# Patient Record
Sex: FEMALE | Race: BLACK OR AFRICAN AMERICAN | URBAN - METROPOLITAN AREA
[De-identification: names, ages, dates, MRNs, and addresses within clinical notes are randomized per-mention and may not be internally consistent; named-entity substitution may affect disease eponyms.]

---

## 2024-10-28 ENCOUNTER — DOCUMENTATION (OUTPATIENT)
Dept: TRANSPLANT | Facility: HOSPITAL | Age: 34
End: 2024-10-28

## 2024-10-28 ENCOUNTER — TELEPHONE (OUTPATIENT)
Dept: TRANSPLANT | Facility: HOSPITAL | Age: 34
End: 2024-10-28

## 2024-10-28 VITALS — HEIGHT: 62 IN | BODY MASS INDEX: 22.07 KG/M2 | WEIGHT: 119.93 LBS

## 2024-10-29 ENCOUNTER — TELEPHONE (OUTPATIENT)
Dept: TRANSPLANT | Facility: HOSPITAL | Age: 34
End: 2024-10-29
Payer: COMMERCIAL

## 2024-11-07 ENCOUNTER — DOCUMENTATION (OUTPATIENT)
Dept: TRANSPLANT | Facility: HOSPITAL | Age: 34
End: 2024-11-07
Payer: COMMERCIAL

## 2024-11-07 NOTE — PROGRESS NOTES
TRANSPLANT REFERRAL REVIEW NOTE    Date: 11/7/2024  Time: 2:02 PM    Geeta Aguirre  was referred by: No referring provider defined for this encounter.     Nephrologist: Filemon  Dialysis unit: [facility]    This kidney transplant referral was reviewed by:    Surgeon: DR. KARLOS MOBLEY  Nephrologist: N/A    The recommendation is to refer patient to FAST TRACK kidney transplant evaluation clinic    Reason referral closure: NA    Task was sent to  to scheduled or close referral as above : NO

## 2024-11-18 ENCOUNTER — TELEPHONE (OUTPATIENT)
Dept: TRANSPLANT | Facility: HOSPITAL | Age: 34
End: 2024-11-18
Payer: COMMERCIAL

## 2024-11-18 DIAGNOSIS — N18.6 ESRD (END STAGE RENAL DISEASE) (MULTI): Primary | ICD-10-CM

## 2024-11-27 ENCOUNTER — TELEPHONE (OUTPATIENT)
Dept: TRANSPLANT | Facility: HOSPITAL | Age: 34
End: 2024-11-27

## 2024-11-27 ENCOUNTER — DOCUMENTATION (OUTPATIENT)
Dept: TRANSPLANT | Facility: HOSPITAL | Age: 34
End: 2024-11-27
Payer: COMMERCIAL

## 2024-11-27 NOTE — TELEPHONE ENCOUNTER
Called to provide info for kidney education class on 12.4.24, but there was no answer. Left voicemail with info

## 2024-11-27 NOTE — Clinical Note
Per EV [facility] Dual Plan &  Comm Plan Medicaid active.  Opened case with [employer]  & faxed clinical to  pending ref# [id number].

## 2024-11-27 NOTE — PROGRESS NOTES
Per EV [facility] Dual Plan &  Comm Plan Medicaid active.  Opened case with [employer]  & faxed clinical to  pending ref# [id number].

## 2024-12-04 ENCOUNTER — OFFICE VISIT (OUTPATIENT)
Dept: TRANSPLANT | Facility: HOSPITAL | Age: 34
End: 2024-12-04
Payer: MEDICARE

## 2024-12-04 ENCOUNTER — DOCUMENTATION (OUTPATIENT)
Dept: TRANSPLANT | Facility: HOSPITAL | Age: 34
End: 2024-12-04
Payer: COMMERCIAL

## 2024-12-04 ENCOUNTER — APPOINTMENT (OUTPATIENT)
Dept: TRANSPLANT | Facility: HOSPITAL | Age: 34
End: 2024-12-04
Payer: MEDICARE

## 2024-12-04 ENCOUNTER — LAB (OUTPATIENT)
Dept: LAB | Facility: LAB | Age: 34
End: 2024-12-04
Payer: MEDICARE

## 2024-12-04 ENCOUNTER — DOCUMENTATION (OUTPATIENT)
Dept: TRANSPLANT | Facility: HOSPITAL | Age: 34
End: 2024-12-04

## 2024-12-04 VITALS
WEIGHT: 121.2 LBS | DIASTOLIC BLOOD PRESSURE: 89 MMHG | HEART RATE: 75 BPM | SYSTOLIC BLOOD PRESSURE: 159 MMHG | OXYGEN SATURATION: 86 % | TEMPERATURE: 97.8 F | BODY MASS INDEX: 22.16 KG/M2

## 2024-12-04 DIAGNOSIS — K42.9 UMBILICAL HERNIA WITHOUT OBSTRUCTION AND WITHOUT GANGRENE: ICD-10-CM

## 2024-12-04 DIAGNOSIS — I10 ESSENTIAL HYPERTENSION: ICD-10-CM

## 2024-12-04 DIAGNOSIS — N18.6 END STAGE RENAL DISEASE (MULTI): ICD-10-CM

## 2024-12-04 DIAGNOSIS — N18.6 ESRD (END STAGE RENAL DISEASE) (MULTI): ICD-10-CM

## 2024-12-04 DIAGNOSIS — Z01.818 PRE-TRANSPLANT EVALUATION FOR KIDNEY TRANSPLANT: Primary | ICD-10-CM

## 2024-12-04 DIAGNOSIS — D63.1 ANEMIA IN ESRD (END-STAGE RENAL DISEASE) (MULTI): ICD-10-CM

## 2024-12-04 DIAGNOSIS — Z99.2 ESRD ON DIALYSIS (MULTI): Primary | ICD-10-CM

## 2024-12-04 DIAGNOSIS — Z13.6 ENCOUNTER FOR SCREENING FOR CARDIOVASCULAR DISORDERS: ICD-10-CM

## 2024-12-04 DIAGNOSIS — N18.6 ESRD ON DIALYSIS (MULTI): Primary | ICD-10-CM

## 2024-12-04 DIAGNOSIS — Z79.899 OTHER LONG TERM (CURRENT) DRUG THERAPY: ICD-10-CM

## 2024-12-04 DIAGNOSIS — Z51.81 ENCOUNTER FOR THERAPEUTIC DRUG LEVEL MONITORING: ICD-10-CM

## 2024-12-04 DIAGNOSIS — Z01.818 PRE-TRANSPLANT EVALUATION FOR KIDNEY TRANSPLANT: ICD-10-CM

## 2024-12-04 DIAGNOSIS — N18.6 ANEMIA IN ESRD (END-STAGE RENAL DISEASE) (MULTI): ICD-10-CM

## 2024-12-04 LAB
ABO GROUP (TYPE) IN BLOOD: NORMAL
ALBUMIN SERPL BCP-MCNC: 4.7 G/DL (ref 3.4–5)
ALP SERPL-CCNC: 116 U/L (ref 33–110)
ALT SERPL W P-5'-P-CCNC: 16 U/L (ref 7–45)
AMYLASE SERPL-CCNC: 63 U/L (ref 29–103)
AST SERPL W P-5'-P-CCNC: 18 U/L (ref 9–39)
BILIRUB DIRECT SERPL-MCNC: 0.1 MG/DL (ref 0–0.3)
BILIRUB SERPL-MCNC: 0.6 MG/DL (ref 0–1.2)
BUN SERPL-MCNC: 39 MG/DL (ref 6–23)
C PEPTIDE SERPL-MCNC: 9.5 NG/ML (ref 0.7–3.9)
CHOLEST SERPL-MCNC: 186 MG/DL (ref 0–199)
CHOLESTEROL/HDL RATIO: 2.6
CMV IGG AVIDITY SERPL IA-RTO: REACTIVE %
CREAT SERPL-MCNC: 7.88 MG/DL (ref 0.5–1.05)
EBV EA IGG SER QL: NEGATIVE
EBV NA AB SER QL: POSITIVE
EBV VCA IGG SER IA-ACNC: POSITIVE
EBV VCA IGM SER IA-ACNC: NEGATIVE
EGFRCR SERPLBLD CKD-EPI 2021: 6 ML/MIN/1.73M*2
ERYTHROCYTE [DISTWIDTH] IN BLOOD BY AUTOMATED COUNT: 14.8 % (ref 11.5–14.5)
EST. AVERAGE GLUCOSE BLD GHB EST-MCNC: 100 MG/DL
HBA1C MFR BLD: 5.1 %
HBV CORE AB SER QL: NONREACTIVE
HBV SURFACE AB SER-ACNC: 5.3 MIU/ML
HBV SURFACE AG SERPL QL IA: NONREACTIVE
HCT VFR BLD AUTO: 37.5 % (ref 36–46)
HCV AB SER QL: NONREACTIVE
HCYS SERPL-SCNC: 33.77 UMOL/L (ref 5–13.9)
HDLC SERPL-MCNC: 71.8 MG/DL
HGB BLD-MCNC: 11.7 G/DL (ref 12–16)
HIV 1+2 AB+HIV1 P24 AG SERPL QL IA: NONREACTIVE
INR PPP: 1.2 (ref 0.9–1.1)
MCH RBC QN AUTO: 29.3 PG (ref 26–34)
MCHC RBC AUTO-ENTMCNC: 31.2 G/DL (ref 32–36)
MCV RBC AUTO: 94 FL (ref 80–100)
NON-HDL CHOLESTEROL: 114 MG/DL (ref 0–149)
NRBC BLD-RTO: 0.8 /100 WBCS (ref 0–0)
PHOSPHATE SERPL-MCNC: 4.8 MG/DL (ref 2.5–4.9)
PLATELET # BLD AUTO: 262 X10*3/UL (ref 150–450)
PROT SERPL-MCNC: 7.3 G/DL (ref 6.4–8.2)
PROTHROMBIN TIME: 13 SECONDS (ref 9.8–12.8)
RBC # BLD AUTO: 3.99 X10*6/UL (ref 4–5.2)
RH FACTOR (ANTIGEN D): NORMAL
TREPONEMA PALLIDUM IGG+IGM AB [PRESENCE] IN SERUM OR PLASMA BY IMMUNOASSAY: NONREACTIVE
VARICELLA ZOSTER IGG INDEX: 4.1 IA
VZV IGG SER QL IA: POSITIVE
WBC # BLD AUTO: 5.9 X10*3/UL (ref 4.4–11.3)

## 2024-12-04 PROCEDURE — 86829 HLA CLASS I/II ANTIBODY QUAL: CPT | Mod: OUT | Performed by: STUDENT IN AN ORGANIZED HEALTH CARE EDUCATION/TRAINING PROGRAM

## 2024-12-04 PROCEDURE — 99215 OFFICE O/P EST HI 40 MIN: CPT | Performed by: INTERNAL MEDICINE

## 2024-12-04 PROCEDURE — 86825 HLA X-MATH NON-CYTOTOXIC: CPT | Mod: OUT | Performed by: STUDENT IN AN ORGANIZED HEALTH CARE EDUCATION/TRAINING PROGRAM

## 2024-12-04 PROCEDURE — 99213 OFFICE O/P EST LOW 20 MIN: CPT | Performed by: STUDENT IN AN ORGANIZED HEALTH CARE EDUCATION/TRAINING PROGRAM

## 2024-12-04 PROCEDURE — 81382 HLA II TYPING 1 LOC HR: CPT | Mod: OUT,59 | Performed by: STUDENT IN AN ORGANIZED HEALTH CARE EDUCATION/TRAINING PROGRAM

## 2024-12-04 RX ORDER — HYDRALAZINE HYDROCHLORIDE 50 MG/1
50 TABLET, FILM COATED ORAL 3 TIMES DAILY
COMMUNITY

## 2024-12-04 RX ORDER — CALCIUM CARBONATE 500 MG/1
TABLET, CHEWABLE ORAL
COMMUNITY

## 2024-12-04 RX ORDER — SPIRONOLACTONE 50 MG/1
50 TABLET, FILM COATED ORAL DAILY
COMMUNITY
End: 2024-12-04 | Stop reason: ALTCHOICE

## 2024-12-04 RX ORDER — CALCITRIOL 0.5 UG/1
0.5 CAPSULE ORAL
COMMUNITY

## 2024-12-04 RX ORDER — LISINOPRIL 20 MG/1
1 TABLET ORAL
COMMUNITY
Start: 2024-07-22 | End: 2024-12-04 | Stop reason: ALTCHOICE

## 2024-12-04 RX ORDER — SEVELAMER CARBONATE FOR ORAL SUSPENSION 2400 MG/1
POWDER, FOR SUSPENSION ORAL
COMMUNITY
Start: 2024-03-13

## 2024-12-04 RX ORDER — ERGOCALCIFEROL 1.25 MG/1
CAPSULE ORAL
COMMUNITY

## 2024-12-04 RX ORDER — TORSEMIDE 20 MG/1
20 TABLET ORAL DAILY
COMMUNITY
End: 2024-12-04 | Stop reason: ALTCHOICE

## 2024-12-04 RX ORDER — LABETALOL 200 MG/1
TABLET, FILM COATED ORAL
COMMUNITY

## 2024-12-04 RX ORDER — NIFEDIPINE 30 MG/1
TABLET, EXTENDED RELEASE ORAL
COMMUNITY
End: 2024-12-04 | Stop reason: ALTCHOICE

## 2024-12-04 RX ORDER — PANTOPRAZOLE SODIUM 40 MG/1
40 TABLET, DELAYED RELEASE ORAL NIGHTLY
COMMUNITY

## 2024-12-04 RX ORDER — AMLODIPINE BESYLATE 10 MG/1
10 TABLET ORAL 2 TIMES DAILY
COMMUNITY

## 2024-12-04 RX ORDER — DIPHENHYDRAMINE HCL 25 MG
25 CAPSULE ORAL EVERY 6 HOURS PRN
COMMUNITY
Start: 2024-02-14

## 2024-12-04 RX ORDER — CARVEDILOL 25 MG/1
25 TABLET ORAL
COMMUNITY

## 2024-12-04 RX ORDER — CLONIDINE 0.3 MG/24H
PATCH, EXTENDED RELEASE TRANSDERMAL
COMMUNITY

## 2024-12-04 RX ORDER — ROPINIROLE 0.25 MG/1
0.25 TABLET, FILM COATED ORAL
COMMUNITY

## 2024-12-04 RX ORDER — GUAIFENESIN 100 MG/5ML
SOLUTION ORAL
COMMUNITY
Start: 2024-02-15

## 2024-12-04 RX ORDER — LISINOPRIL 20 MG/1
TABLET ORAL
COMMUNITY
Start: 2024-11-01 | End: 2024-12-04 | Stop reason: ALTCHOICE

## 2024-12-04 RX ORDER — ASCORBIC ACID, THIAMINE MONONITRATE,RIBOFLAVIN, NIACINAMIDE, PYRIDOXINE HYDROCHLORIDE, FOLIC ACID, CYANOCOBALAMIN, BIOTIN, CALCIUM PANTOTHENATE, 100; 1.5; 1.7; 20; 10; 1; 6000; 150000; 5 MG/1; MG/1; MG/1; MG/1; MG/1; MG/1; UG/1; UG/1; MG/1
1 CAPSULE, LIQUID FILLED ORAL
COMMUNITY
Start: 2024-09-19

## 2024-12-04 RX ORDER — ALBUTEROL SULFATE 90 UG/1
8 INHALANT RESPIRATORY (INHALATION) EVERY 6 HOURS PRN
COMMUNITY

## 2024-12-04 ASSESSMENT — PAIN SCALES - GENERAL: PAINLEVEL_OUTOF10: 0-NO PAIN

## 2024-12-04 NOTE — PROGRESS NOTES
Kidney Patient Summary    Date of appointment: 2024    Name: Geeta Aguirre    : 1990    MRN: [id number]    Diagnosis: Hypertensive Nephrosclerosis    ABO: No results found for requested labs within last 1825 days.     Phase: Referral  Status: Active        Last Seen:   Referring Nephrologist: Dianne Colbert MD     HD Unit:  [facility]   Dialysis Start:   Access:       Days:      PCP: Dr. Juancarlos Vann       Endocrinologist:     BMI Readings from Last 1 Encounters:   10/28/24 21.93 kg/m²     Blood Transfusion:    Medical History/Hospitalizations:    Anemia      Chronic kidney disease-mineral and bone disorder      Depression      DVT (deep venous thrombosis) (HCC)      HTN (hypertension)      Hyperaldosteronism (HCC)      NSTEMI (non-ST elevated myocardial infarction) (HCC)       Type II    Pre-eclampsia      Surgical History:     SECTION HX         x 2     Donors: Yes    Staff:  Nephrologist:    Surgeon:     :      Testing Date:     Serologies:    CMV:     No results found for requested labs within last 365 days.  EBV Panel:  KARO-HAHN VCA IGG:   No results found for requested labs within last 365 days.   KARO-HAHN VCA IGM: No results found for requested labs within last 365 days.   EBV EARLY ANTIGEN ANTIBODY, IGG: No results found for requested labs within last 365 days.   EPSTIEN-BARR NUCLEAR ANTIGEN AB: No results found for requested labs within last 365 days.   Tox:    AMPHETAMINE SCREEN BLOOD: No results found for requested labs within last 365 days.   METHAMPHETAMINE, BLOOD, SCREEN: No results found for requested labs within last 365 days.   BENZODIAZEPINES SCREEN BLOOD: No results found for requested labs within last 365 days.   BUPRENORPHINE SCREEN BLOOD: No results found for requested labs within last 365 days.   CANNABINOIDS SCREEN BLOOD: No results found for requested labs within last 365 days.   COCAINE SCREEN BLOOD: No results found for  requested labs within last 365 days.   METHADONE SCREEN BLOOD: No results found for requested labs within last 365 days.   OXYCODONE SCREEN BLOOD: No results found for requested labs within last 365 days.   PHENCYCLIDINE SCREEN BLOOD: No results found for requested labs within last 365 days.   OPIATE SCREEN BLOOD: No results found for requested labs within last 365 days.   DRUG SCREEN COMMENT BLOOD: No results found for requested labs within last 365 days.   BARBITURATE SCREEN BLOOD: No results found for requested labs within last 365 days.   Cotinine: No results found for requested labs within last 365 days.  HBV ag:   No results found for requested labs within last 365 days.  HBV ab:   No results found for requested labs within last 365 days.  HBV c:     No results found for requested labs within last 365 days.  HCV:        No results found for requested labs within last 365 days.  HIV:    No results found for requested labs within last 365 days.  RPR:    No results found for requested labs within last 365 days.  TSpot:   No results found for requested labs within last 365 days.   VZV:   VARICELLA ZOSTER IGG: No results found for requested labs within last 365 days.   VARICELLA ZOSTER IGG INDEX: No results found for requested labs within last 365 days.   A1C:       HEMOGLOBIN A1C/HEMOGLOBIN TOTAL IN BLOOD: No results found for requested labs within last 365 days.   ESTIMATED AVERAGE GLUCOSE FOR HBA1C: No results found for requested labs within last 365 days.   CPep:  No results found for requested labs within last 365 days.  PSA:    No results found for requested labs within last 365 days.  Other:     Test/Consult Impression Next Scheduled Date   CXR XR chest 2 views    Result Date: 12/2/2022  Findings suggestive of interstitial and alveolar pulmonary edema. Multifocal pneumonia alternatively possible in the proper clinical setting. Report Dictated on Workstation: [id number]  Electronically Signed By: Jamie  Hermes  Electronically Signed Date/Time: 12/2/2022 3:19 AM EST     XR chest 2 views    Result Date: 11/13/2022  No acute findings. Tunneled dialysis catheter is in the right atrium. Report Dictated on Workstation: [id number]  Electronically Signed By: Xiang Angela  Electronically Signed Date/Time: 11/13/2022 10:52 PM EST        EKG- 2/4/24 Sinus rhythm   Anterolateral infarct, age indeterminate   Electronically Signed On 02- 15:56:27 EST by Chris Watson     Echo- 2/1/24   Left Ventricle: Left ventricle size is normal.  Findings consistent   with severe concentric hypertrophy. Normal left ventricular systolic   function. EF by 2D Simpsons Biplane is 68%. Normal wall motion.     Right Ventricle: Right ventricle size is normal. Normal systolic   function.    Left Atrium: Left atrium is moderately dilated.     Pericardium: Trivial pericardial effusion present.     IVC/SVC: IVC diameter is dilated and decreases less than 50% during   inspiration; therefore the estimated right atrial pressure is elevated   (~15 mmHg).     No significant valvular abnormalities.   o results found.     CT Cardiac Score No results found.     NM Stress Test No results found.     Cardiac MRI No results found.     Left Heart Catheterization No results found.     Cardiology last visit impression      Pulmonary Function Test No results found for this or any previous visit.    CT Abd/Pelvis- need to get the films  CT abdomen pelvis wo IV contrast    Result Date: 1/3/2024  1. Limited exam due to lack of IV contrast, oral contrast, and intra-abdominal fat. 2. Diffuse anasarca. Intra-abdominal ascites, small in volume. 3. Suspect large right pleural effusion (CT chest has been ordered, correlate with that exam). 4. Ventral midline abdominal wall hernia containing fat and ascites, similar to prior exam. 5. Small kidneys. Report Dictated on Workstation: [id number]  Electronically Signed By: Mari Akhtar MD  Electronically Signed  Date/Time: 1/3/2024 4:00 PM EST               Pap No results found for requested labs within last 1825 days.  No results found for requested labs within last 1825 days.         Other:

## 2024-12-04 NOTE — PROGRESS NOTES
Eval Clinic Note:  Patient attended evaluation appts on 12/4/2024 with Dr. Sumner and Dr. Kothari  Medications and allergies reviewed with patient.  Patient presented to appointment with mom and grandma.  Patient ambulated independently  History:  Patient has a history of ESRD, HTN, DVT, Depression, hyperaldosteronism.   Dialysis: HD home dialysis M-S ACCESS Right IJ, since 2022.     Testing completed recently: CT scan, echocardiogram, pap  Testing needed for evaluation:  See AVS  Listing Consent: no KDPI >85%, yes DCD, Hep C, Hep B Yes  Comments:  Provided patient with my contact info for questions and concerns.       LISTING EDUCATION    Patient educated regarding the following prior to placement on the transplant waiting list:   The patient?s medical condition, prognosis, and treatment plan.   The expectations and patient responsibilities while on the waiting list, including:   Keeping the transplant center informed of any changes in contact information or insurance coverage   Notifying the transplant center of any changes in medical status   Required testing and/or re-evaluation appointments while awaiting transplant   An overview of the surgical procedure, including potential risks and alternatives.   Information regarding what to expect during the inpatient admission and recovery period.   A discussion regarding organ offers and types of potential donors, including potential risks that may be associated with specific types of donors that could affect the success of the transplant or the health of the patient.  The right to refuse transplantation.     Patient was given the opportunity to have questions answered. Patient was provided a copy of the informed consent for transplant listing.    Education provided by:  Transplant Coordinator: KM Mayfield RN  Transplant Physician: CLARENCE Sumner MD    Signed listing informed consent received? YES/NO  Patient agrees to be listed for the following:  KDPI > 85%  [No]  Donors After Circulatory Death (DCD) [Yes]  Donors with a Positive Core Antibody for Hepatitis B [Yes]  Donors with Hepatitis C Virus to recipients with hepatitis C [No]  Donors with Hepatitis C Virus to Negative hepatitis C recipients [Yes]    Patient will be discussed at an upcoming selection committee to determine eligibility to be placed on the [facility] waiting list.

## 2024-12-04 NOTE — PROGRESS NOTES
TRANSPLANT NEPHROLOGY CONSULT :   KIDNEY TRANSPLANT RECIPIENT EVALUATION        SERVICE DATE: 2024     REASON FOR CONSULT/CHIEF COMPLAINT:    FOR KIDNEY TRANSPLANT RECIPIENT EVALUATION.    HPI:    Ms. Tobi Aguirre is a 34 y.o. female with past medical history significant for :    ESKD; on HD 3/2024 (  and Friday, EDW 50 KG, RIJ Permcath). EDW 50 Kg.  Pt reported no bx or Renasight done prior.     HTN , diagnosed at age of 15 y/o  Hx of CHF, NSTEMI 10/2022  Hx DVT, Catheter associated. Not on anti coagulation at this time.   Hx of med non compliance per record;left ED AMA   Ventral/umbilical hernia  ? Hx of hyperaldosteronism  C Section x 2, last 2012  Hx of transaminitis    BLOOD TYPE:  PENDING    Functional status :   good    Urine output :   The patient makes urine output approximately <1 cup per day.    Potential Donor :  sister    Last GFR /Creatinine:   NA on HD    Hx of PRBC Transfusion  YES, multiple    Pregnancy History  2 children  1   Hx of pre-eclampsia    Recent Hospitalization/ED visit:  Denied    The patient is here for kidney transplant recipient evaluation. Ms. Tobi Aguirre has had multiple complications from end stage severe renal disease including anemia, secondary hyperparathyroidism, and osteodystrophy. The patient is here today for an evaluation for kidney transplantation to improve quality of life and decrease the risk of cardiovascular disease, coronary artery disease and stroke.     The patient is doing well without complaints. Denied chest pain, shortness of breath, palpitation, dyspnea on exertion, dysuria, fever, nausea, vomiting, diarrhea and flu-liked symptoms. No swelling of the extremities. No recent hospitalization or ED visit.      ROS:  Review of  14 systems was performed system by system. See HPI. Otherwise, the symptoms were negative.    PAST MEDICAL HISTORY: Please see HPI.        PAST SURGICAL HISTORY: Please see HPI.        SOCIAL HISTORY: Please see  our 's note for details.    Social History     Socioeconomic History    Marital status: Single     Spouse name: Not on file    Number of children: Not on file    Years of education: Not on file    Highest education level: Not on file   Occupational History    Not on file   Tobacco Use    Smoking status: Not on file    Smokeless tobacco: Not on file   Substance and Sexual Activity    Alcohol use: Not on file    Drug use: Not on file    Sexual activity: Not on file   Other Topics Concern    Not on file   Social History Narrative    Not on file     Social Drivers of Health     Financial Resource Strain: Not on file   Food Insecurity: Not on file   Transportation Needs: No Transportation Needs (1/30/2024)    Received from [facility]    PRAPARE - Transportation     Lack of Transportation (Medical): No     Lack of Transportation (Non-Medical): No   Physical Activity: Not on file   Stress: Not on file   Social Connections: Not on file   Intimate Partner Violence: Not At Risk (1/30/2024)    Received from [facility]    Humiliation, Afraid, Rape, and Kick questionnaire     Fear of Current or Ex-Partner: No     Emotionally Abused: No     Physically Abused: No     Sexually Abused: No   Housing Stability: Not on file       FAMILY HISTORY:  No family history on file.    MEDICATION LIST:  Current Outpatient Medications   Medication Instructions    albuterol 90 mcg/actuation inhaler 8 puffs, Every 6 hours PRN    amLODIPine (NORVASC) 10 mg, 2 times daily    calcitriol (ROCALTROL) 0.5 mcg, Daily RT    Calcium Antacid 200 mg calcium (500 mg) chewable tablet Chew 1 tablet (500 mg) in the morning and 1 tablet (500 mg) at noon and 1 tablet (500 mg) in the evening. Chew with meals.    carvedilol (COREG) 25 mg, 2 times daily (morning and late afternoon)    cloNIDine (Catapres-TTS) 0.3 mg/24 hr patch APPLY 1 PATCH TO SKIN ONCE WEEKLY    diphenhydrAMINE (BENADRYL) 25 mg, Every 6 hours PRN    epoetin ruddy-epbx (RETACRIT)  5,000 Units    ergocalciferol (Vitamin D-2) 1.25 MG (64320 UT) capsule Take 1 capsule (1.25 mg) by mouth 1 (one) time per week.    guaiFENesin (Robitussin) 100 mg/5 mL syrup TAKE 10 MLS BY MOUTH EVERY 4 HOURS AS NEEDED FOR COUGH FOR UP TO 10 DAYS    hydrALAZINE (APRESOLINE) 50 mg, 3 times daily    labetalol (Normodyne) 200 mg tablet Take 2 tablets (400 mg) by mouth 2 times daily.    pantoprazole (PROTONIX) 40 mg, Nightly    Renal Caps 1 mg capsule 1 capsule, Daily (0630)    rOPINIRole (REQUIP) 0.25 mg, Daily RT    sevelamer carbonate (Renvela) 2.4 gram packet take 1 PACKET BY MOUTH 3 TIMES DAILY WITH MEALS       ALLERGY  Allergies   Allergen Reactions    Lisinopril Rash       PHYSICAL EXAM:    Visit Vitals  /89   Pulse 75   Temp 36.6 °C (97.8 °F) (Temporal)   Wt 55 kg (121 lb 3.2 oz)   SpO2 (!) 86%   BMI 22.16 kg/m²   BSA 1.55 m²        General Appearance - NAD, Good speech, oriented and alert  HEENT - Supple. Not pale. No jaundice. No cervical lymphadenopathy. Pharynx and tonsils are not injected.  CVS - RRR. Normal S1/S2. No murmur, click , rub or gallop  Lungs- clear to auscultation bilaterally  Abdomen - soft , not tender, no guarding, no rigidity. No hepatosplenomegaly. Normal bowel sounds. No masses and ascites.   Musculoskeletal /Extremities - no edema. Full ROM. No joint tenderness.   Neuro/Psych - appropriate mood and affect. Motor power V/V all extremities. CN I -XII were grossly intact.  Skin - No visible rash  Dialysis access :  RIJ Permacath is clean, dry and intact. No signs of infection.    LABS:    Lab Results   Component Value Date    HGBA1C 4.7 09/23/2022     par    EKG:  Reviewed    Echocardiogram:   No results found for this or any previous visit from the past 1825 days.        ASSESSMENT AND PLAN:    After completion of taking history and physical examination, the patient seems to be a reasonable candidate to proceed with the rest of transplant evaluation.    However, patient will need the  following tests to determine the eligibility for kidney transplant per [facility]'s kidney transplant evaluation guideline :    - Would start with SW clearance for hx of medical non compliance per record  - PAP  -Echo    -Update cancer screening per age/sex  - Will need to complete the rest of work up per protocol    *For AA patients :    After reviewing  the records on [employer] , we are      Unable to find the qualified GFR for waiting time modification.         =========================================================================    The case will be presented at the selection committee at the [facility] [place].  The final decision from the committee will be sent out to notify the patient/primary care physician/ nephrologist. The above recommendations were discussed with the patient at length.     In addition, the following were also discussed:    - Risks and benefits of transplantation, both short-term and long-term    - Risk of primary graft non-function, DGF, SGF, rejection, primary disease recurrence, return to dialysis    - Risks of immunosuppression including infections, CA, CV risk    - Need for compliance with medications and medical care in general    - We reviewed the necessity of HBV vaccination and other recommended vaccines before a kidney transplant, following the Centers for Disease Control and Prevention(CDC)'s guidelines [https://www.cdc.gov/vaccines/schedules/downloads/adult/adult-combined-schedule.pdf]     Currently, the patient has received the following vaccines:      There is no immunization history on file for this patient.      The patient expressed understanding of the above and wishes to proceed.  I answered all of his questions. I urged the patient to look for living donors.    - I have spent over 60 minutes with the patient, reviewing medical record, lab result , CXR result and other specialty's notes. More than 50% of the time was  spent in counseling, explaining about the transplantation and answering the questions. I also reviewed the medical record, blood test results, imaging and previous studies which were obtained from the nephrologists. I also order the tests needed to complete the evaluation and I will review the results of those tests.    Thank you for this consultation. Please feel free to contact me for questions.      Isis Sumner    Transplant Nephrology

## 2024-12-04 NOTE — PATIENT INSTRUCTIONS
Thank You for coming to [facility] [place].  You are currently in evaluation for kidney transplant.  In order to be eligible to be placed on the wait list you must complete certain tests and appointments.  The following tests/appointments will be scheduled for you:    []   EKG  []   Echocardiogram  []  Stress test  [x]  Ultrasound of Abdomen   [x]  CT cardiac score  []  Chest x-ray  Liver Functional test id needed. Primary coordinator to order   Renasight will be completed today     []  Nephrology appointment  []  Surgeon appointment  [x]  Social work appointment      [x]   Financial counselor telephone appointment  [x]  Dietician appointment (optional)    Please complete the following testing with your primary care doctor:      []  Mammogram  [x]  Pap test      Please call Vielka Ferrell RN, Pre-Kidney Transplant Coordinator, at [phone] if you have any questions.

## 2024-12-04 NOTE — PROGRESS NOTES
Kidney Transplant Evaluation Office Visit    Chief Complaint: Patient presents for kidney transplant evaluation    History of Present Illness:  Geeta Aguirre  is a 34 y.o. female presents with ESRD from chronic hypertension.    Began dialysis in 2022 and currently tolerating home HD via a right jugular TDC without any hypotension or midodrine. She is oliguric.    Additional medical history includes hypertension diagnosed when she was 16, ? Hyperaldosteronism, pre-eclampsia, CHF, NSTEMI in 10/2022, and catheter associated DVT not on anti-coagulation currently. She is on amlodipine, carvedilol, Clonidine, Hydralazine, and Labetalol for BP control.    In clinic today, she denies any chest pain, SOB, palpitations, as well as any recent fever, abdominal pain, difficulty voiding or other urinary symptoms, constipation, or poor oral intake.    She has had 2 c-sections and an UHR.     Hemodialysis: Home HD 4 times a week  Dialysis Access: TDC  Urine Status: oliguric.   Disease Etiology: hypertensive nephropathy.   Disease Complications: congestive heart failure, dyslipidemia and hypertension.   PVD: NO - CT pending import  Prior Abdominal Surgery: YES -  pffanenstiel  Prior Malignancy: O   BMI: 22.16  Potential Living Donors: YES - Sister    Review of Systems:  Cardiac: Denies chest pain, palpitations  : decreased urine output. Denies history of gross hematuria, nephrolithiasis, urinary retention, or recurrent UTIs.  Vascular: Denies personal or familial history of DVT/PE. No active claudication or non-healing LE wounds.  Extremities: LE Edema -   Functional Status: Can walk up 2 flights of stairs    Past Medical History:  ESRD on HD  Hypertension  ?Hyperaldosteronism  pre-eclampsia   CHF, NSTEMI   Catheter associated DVT not on anti-coagulation currently    Past Surgical History:  C-sections    Social History:  Functional, independent in ADLs and iADLs  Mom and great grandmother  primary support and here with her today  Denies smoking etoh or drug use    Transfusions: Yes  Pregnancy: Yes 3  Prior transplant: No    Family History:  Mother: n/a  Father: n/a  Sibling: n/a    Physical Exam:    /89   Pulse 75   Temp 36.6 °C (97.8 °F) (Temporal)   Wt 55 kg (121 lb 3.2 oz)   SpO2 (!) 86%   BMI 22.16 kg/m²   BSA 1.55 m²     Gen: A+OX3; NAD  HEENT: PERRL, sclera anicteric, MMM  Cardiac: RRR  Chest: Normal inspiratory effort  Abdomen: S/NT/ND. Reducible umbilical hernia noted.  Ext: No LE edema  Vascular: 2+ palpable femoral pulses  Psychiatric: Normal mood, affect    Assessment/Plan:  - The patient is a good candidate for kidney transplantation pending complete work-up  - SW evaluation pending    - Prior imaging from OSH (images not available yet) reports Ascites and right pleural effusion with thoracentesis performed - no known liver disease - will obtain LFTs and Liver US. Desires UH repair, will review.    - Routine age/gender based screening  - Cardiac testing per protocol: ECHO/stress test/ Cardiology    - Non-contrast CT scan abdomen/pelvis - import    Surgical Considerations:  Import imaging and review    Transplant Education:    I had a discussion with this patient regarding 1 year graft and patient survival statistics following renal transplantation for both living and  donor allograft recipients. This data included [facility] data compared to National data readily available for review on https://[email or web address]. The patient also had attended the kidney transplant education class provided by the transplant institute.     The difference between allograft function was discussed comparing living donor, KDPI 0-85%, and >85% kidneys.     Further discussion included:  -The transplant selection committee process.  -The need for lifelong immunosuppressive therapy, and the side effects of these medications including the risk of infections, cancer, and lymphoma.  -The wait list  time approximately is 5 years or more for  donor transplants and the statistical superiority of a living donor.     -Using identified donors with risk criteria for transmission of infection  -The possibility of utilizing  donors with known HCV antibody and/or JANUSZ positivity and post-transplant treatment/surveillance protocol  -Potential transmission of infectious disease from any  donors, as well as living donors.   -The possibility of transmission of tumors and infections via the transplanted organ.  -The inability to completely test for all potential harmful tumors or infectious agents.  -The possibility of listing at multiple locations.     Surgical complications including need for reoperation(s) including but not limited to:  -Bleeding.  -Repair of leaks.  -Control of infection.  -Blood clots in the transplant vessels.  -Possible kidney transplant removal.     The medical complications including but not limited to:  -Death.  -Cardiac.  -Pulmonary.  -Infectious.  -Neurologic.  -Other Complications.     We also discussed how the kidney transplant could function:  -Non-function and possible kidney transplant removal within the first 3 to 6 months.  -Delayed graft function (dialysis needed after transplant).  -The potential of recurrence of kidney disease leading to kidney transplant graft loss.    Time Attestation:  I spent 60 minutes with the patient, over 50 minutes in counseling and education as outlined above.    Mykel Kothari MD, [facility]  Transplant & Hepatobiliary Surgery

## 2024-12-04 NOTE — PATIENT INSTRUCTIONS
Thank You for coming to [facility] [place].  You are currently in evaluation for kidney transplant.  In order to be eligible to be placed on the wait list you must complete certain tests and appointments.  The following tests/appointments will be scheduled for you:    []   EKG  [x]   Echocardiogram  []  Stress test  [x]  DUS of Abdomen   []  CT cardiac score  []  Liver Function Test       []  Nephrology appointment  []  Surgeon appointment  [x]  Social work appointment  []  Cardiology consultation  [x]   Financial counselor telephone appointment  [x]  Dietician appointment (optional)    Please complete the following testing with your primary care doctor:    []  Colonoscopy  []  Mammogram  [x]  Pap test  If LFT and DUS of abdomen are ok per Dr. Kothari patient will be proceeding with umbilical hernia repair.Patient will contact primary coordinator if she decide to proceed with AVF creation, vein mapping will be needed.   Jorgeasiedwint was completed in clinic today       Please call Vielka Ferrell RN, Pre-Kidney Transplant Coordinator, at [phone] if you have any questions.

## 2024-12-04 NOTE — PROGRESS NOTES
Patient attended class on 12/04/24.   Accompanied to class with 2 female support persons.   Oral and written education was provided.  Patient remained attentive throughout the review session, asking appropriate questions.  Evaluation consents were signed.     PRE-TRANSPLANT EDUCATION  Patient received education regarding the following topics as part of their pre-transplant evaluation:  The evaluation process, including:   Transplant team members and roles    Required consultations and testing   Selection criteria and suitability for transplant   Listing process and receiving an organ offer   Psychosocial and financial considerations for a successful transplant   Patient responsibilities, including the necessity of adhering to a strict medical regimen  An overview of the surgical procedure   Potential medical, surgical, and psychosocial risks to transplantation, including:   Wound infection   Pneumonia   Blood clot formation   Organ rejection, failure, and possibility of re-transplantation   Lifetime immunosuppression therapy and associated risks   Arrhythmias and cardiovascular collapse   Multi-organ system failure   Death   Depression   Post-Traumatic Stress Disorder   Generalized anxiety, issues of dependence, and feelings of guilt  Available alternatives to transplantation  Donor risk factors that could affect the success of the transplant and the health of the patient, including:   Donor age   Donor medical and social history   Condition of the organ   Risk of qasim cancer, HIV, Hepatitis B, Hepatitis C, or malaria if the infection is not detectable at the time of donation  Patient?s right to withdraw consent for transplantation at any time during the process  Transplants not performed in a Medicare-approved transplant center could affect the patient?s ability to have immunosuppression medication paid for under Medicare part B.   Multiple listing options.    Patient was given the opportunity to have  questions answered. Patient was provided a copy of the informed consent for transplant evaluation.    Signed evaluation informed consent received? 12/04/24

## 2024-12-05 ENCOUNTER — DOCUMENTATION (OUTPATIENT)
Dept: TRANSPLANT | Facility: HOSPITAL | Age: 34
End: 2024-12-05
Payer: COMMERCIAL

## 2024-12-05 ENCOUNTER — LAB REQUISITION (OUTPATIENT)
Dept: LAB | Facility: CLINIC | Age: 34
End: 2024-12-05
Payer: MEDICARE

## 2024-12-05 DIAGNOSIS — N18.6 END STAGE RENAL DISEASE (MULTI): ICD-10-CM

## 2024-12-05 LAB
FLOW AUTOCROSSMATCH: NORMAL
HLA CLASS I AB SCREEN,FC: NORMAL
HLA CLASS II AB SCREEN,FC: NORMAL
HLA CLS I TYP PNL BLD/T DONR HIGH RES: NORMAL
HLA RESULTS: NORMAL
HLA-DP2 QL: NORMAL
HLA-DQB1 HIGH RES: NORMAL
HLA-DRB1 HIGH RES: NORMAL

## 2024-12-05 NOTE — PROGRESS NOTES
Per 12/04/24 liza/kari from [facility] [employer]  kidney txp eval & listing approved 11/27/24-11/27/25 for eval & 11/27/24-11/27/29 for listing.  She needs selection note faxed to her .  Precert needed at  at time of txp.

## 2024-12-05 NOTE — Clinical Note
Per 12/04/24 liza/kari from [facility] of [employer]  kidney txp eval & listing approved 11/27/24-11/27/25 for eval & 11/27/24-11/27/29 for listing.

## 2024-12-06 LAB
AMPHETAMINES SERPL QL SCN: NEGATIVE NG/ML
ANNOTATION COMMENT IMP: NORMAL
BARBITURATES SERPL QL SCN: NEGATIVE NG/ML
BENZODIAZ SERPL QL SCN: NEGATIVE NG/ML
BUPRENORPHINE SERPL-MCNC: NEGATIVE NG/ML
CANNABINOIDS SERPL QL SCN: NEGATIVE NG/ML
COCAINE SERPL QL SCN: NEGATIVE NG/ML
METHADONE SERPL QL SCN: NEGATIVE NG/ML
METHAMPHET SERPL QL: NEGATIVE NG/ML
NIL(NEG) CONTROL SPOT COUNT: NORMAL
OPIATES SERPL QL SCN: NEGATIVE NG/ML
OXYCODONE SERPL QL: NEGATIVE NG/ML
PANEL A SPOT COUNT: 0
PANEL B SPOT COUNT: 1
PCP SERPL QL SCN: NEGATIVE NG/ML
POS CONTROL SPOT COUNT: NORMAL
T-SPOT. TB INTERPRETATION: NEGATIVE

## 2024-12-07 LAB
COTININE SERPL-MCNC: <5 NG/ML
NICOTINE SERPL-MCNC: <5 NG/ML

## 2024-12-10 LAB
FLOW AUTOCROSSMATCH: NORMAL
HLA CLASS I AB SCREEN,FC: NORMAL
HLA CLASS II AB SCREEN,FC: NORMAL
HLA RESULTS: NORMAL
HLA RESULTS: NORMAL

## 2024-12-13 DIAGNOSIS — K42.9 UMBILICAL HERNIA WITHOUT OBSTRUCTION AND WITHOUT GANGRENE: ICD-10-CM

## 2024-12-13 DIAGNOSIS — R18.8 OTHER ASCITES: ICD-10-CM

## 2024-12-14 LAB
HLA CLS I TYP PNL BLD/T DONR HIGH RES: NORMAL
HLA RESULTS: NORMAL
HLA-DP2 QL: NORMAL
HLA-DQB1 HIGH RES: NORMAL
HLA-DRB1 HIGH RES: NORMAL

## 2024-12-24 ENCOUNTER — HOSPITAL ENCOUNTER (OUTPATIENT)
Dept: RADIOLOGY | Facility: CLINIC | Age: 34
Discharge: HOME | End: 2024-12-24
Payer: MEDICARE

## 2024-12-24 ENCOUNTER — TELEPHONE (OUTPATIENT)
Dept: TRANSPLANT | Facility: HOSPITAL | Age: 34
End: 2024-12-24
Payer: MEDICARE

## 2024-12-24 DIAGNOSIS — R18.8 OTHER ASCITES: ICD-10-CM

## 2024-12-24 DIAGNOSIS — K42.9 UMBILICAL HERNIA WITHOUT OBSTRUCTION AND WITHOUT GANGRENE: ICD-10-CM

## 2024-12-24 DIAGNOSIS — Z01.818 PRE-TRANSPLANT EVALUATION FOR KIDNEY TRANSPLANT: Primary | ICD-10-CM

## 2024-12-24 PROCEDURE — 93975 VASCULAR STUDY: CPT

## 2024-12-24 NOTE — TELEPHONE ENCOUNTER
Called the patient regarding her Renas[facility] test results: notable for compound heterozygous G1/G2 for APOL1.    I explained the clinical significance of this variants - which are associated with early onset kidney disease. However, at this time, this does not change our management.   She can proceed with transplant evaluation.    However, I strongly encouraged her sister, who is her potential donor, to get tested. I explained that in general one copy of risk variants (either G1 or G2) is not a contraindication for donation per se but two copies might be.     She voices understanding and has no further questions.  I offered her access to patient portal for further information and genetic counseling but she declined. She says she is going to do research about it on her own.

## 2025-01-02 ENCOUNTER — OFFICE VISIT (OUTPATIENT)
Dept: SURGERY | Facility: CLINIC | Age: 35
End: 2025-01-02
Payer: COMMERCIAL

## 2025-01-02 VITALS
SYSTOLIC BLOOD PRESSURE: 163 MMHG | DIASTOLIC BLOOD PRESSURE: 97 MMHG | BODY MASS INDEX: 22.13 KG/M2 | HEART RATE: 71 BPM | RESPIRATION RATE: 20 BRPM | WEIGHT: 121 LBS

## 2025-01-02 DIAGNOSIS — Z01.818 PRE-TRANSPLANT EVALUATION FOR CHRONIC KIDNEY DISEASE: ICD-10-CM

## 2025-01-02 DIAGNOSIS — K42.9 UMBILICAL HERNIA WITHOUT OBSTRUCTION AND WITHOUT GANGRENE: ICD-10-CM

## 2025-01-02 PROCEDURE — 99214 OFFICE O/P EST MOD 30 MIN: CPT | Performed by: STUDENT IN AN ORGANIZED HEALTH CARE EDUCATION/TRAINING PROGRAM

## 2025-01-02 ASSESSMENT — PAIN SCALES - GENERAL: PAINLEVEL_OUTOF10: 0-NO PAIN

## 2025-01-02 NOTE — PROGRESS NOTES
Subjective   Patient ID: Patient is a 34 y.o. female who presents for umbilical hernia repair consultation.    History of ESRD, chronic HTN, CHF, NSTEMI , h/o catheter associated DVT not on anticoags, pre-eclampsia, hyperaldosteronism, h/o 2 caesarian section.    Patient began HD in  via TDC. She does home hD MTTF.    Supra-umbilical hernia is reducible without overlying skin changes. No prior episodes of incarceration or strangulation. Does have episodic abdominal discomfort/ pain as well as remains worried about potential obstruction/ strangulation.    Prior , pfannenstiel.    No tobacco use.    Review of Systems:  Constitutional:  Negative for chills and fever.   HENT: Negative.  Negative for congestion.    Eyes: Negative.    Respiratory:  Negative for cough and shortness of breath.    Cardiovascular:  Negative for chest pain.   Gastrointestinal:  Negative for abdominal distention, abdominal pain, constipation and diarrhea. + for abdominal bulge. No matt-umbilical scars  Endocrine: Negative for cold intolerance and heat intolerance.   Genitourinary:  Negative for dysuria, frequency, hematuria and urgency.   Musculoskeletal:  Negative for arthralgias.   Skin:  Negative for color change.   Allergic/Immunologic: Negative for environmental allergies.   Neurological:  Negative for dizziness, weakness and light-headedness.   Hematological:  Negative for adenopathy.   Psychiatric/Behavioral:  Negative for agitation, confusion and hallucinations.      Physical Exam:  Constitutional:       Appearance: Normal appearance.   HENT:      Head: Normocephalic and atraumatic.      Nose: Nose normal.   Eyes:      Pupils: Pupils are equal, round, and reactive to light.   Cardiovascular:      Rate and Rhythm: Normal rate.   Pulmonary:      Effort: Pulmonary effort is normal. No respiratory distress.   Abdominal:      General: There is no distension.      Palpations: Abdomen is soft. There is no mass.    Musculoskeletal:         General: No swelling. Normal range of motion.      Cervical back: Normal range of motion.   Skin:     General: Skin is warm and dry.   Neurological:      General: No focal deficit present.      Mental Status: Alert and oriented to person, place, and time.   Psychiatric:         Mood and Affect: Mood normal.         Behavior: Behavior normal.     Assessment/Plan:  34yF ESRD on Home HD via TDC 4 times a week - MTTF    Reducible Umbilical hernia, symptomatic, desires repair    Previous CT AP in 9/2023 and 1/2024 reports ascites and right pleural effusion - Liver US in 12/2024 shows hepatomegaly, no overt ascites   Will obtain - CXR and Echo   Import CT AP and Chest from 1/2024 from [facility]    Will schedule outpatient UHR 23h Obs    Mykel Kothari MD, [facility]  Transplant & Hepatobiliary Surgery

## 2025-01-03 ENCOUNTER — HOSPITAL ENCOUNTER (OUTPATIENT)
Facility: HOSPITAL | Age: 35
Setting detail: OUTPATIENT SURGERY
End: 2025-01-03
Attending: STUDENT IN AN ORGANIZED HEALTH CARE EDUCATION/TRAINING PROGRAM | Admitting: STUDENT IN AN ORGANIZED HEALTH CARE EDUCATION/TRAINING PROGRAM
Payer: COMMERCIAL

## 2025-01-03 DIAGNOSIS — K42.9 UMBILICAL HERNIA WITHOUT OBSTRUCTION AND WITHOUT GANGRENE: Primary | ICD-10-CM

## 2025-01-06 ENCOUNTER — TELEPHONE (OUTPATIENT)
Dept: TRANSPLANT | Facility: HOSPITAL | Age: 35
End: 2025-01-06
Payer: MEDICARE

## 2025-01-06 ENCOUNTER — TELEPHONE (OUTPATIENT)
Facility: HOSPITAL | Age: 35
End: 2025-01-06
Payer: MEDICARE

## 2025-01-06 NOTE — TELEPHONE ENCOUNTER
Patient called requesting to speak with coordinator about  rescheduling an appt from 1/8 .  Patient call back number is [phone] .

## 2025-01-07 ENCOUNTER — APPOINTMENT (OUTPATIENT)
Dept: CARDIOLOGY | Facility: CLINIC | Age: 35
End: 2025-01-07
Payer: MEDICARE

## 2025-01-08 ENCOUNTER — HOSPITAL ENCOUNTER (OUTPATIENT)
Dept: RADIOLOGY | Facility: EXTERNAL LOCATION | Age: 35
Discharge: HOME | End: 2025-01-08

## 2025-01-15 ENCOUNTER — DOCUMENTATION (OUTPATIENT)
Dept: TRANSPLANT | Facility: HOSPITAL | Age: 35
End: 2025-01-15
Payer: MEDICARE

## 2025-01-15 ENCOUNTER — APPOINTMENT (OUTPATIENT)
Facility: HOSPITAL | Age: 35
End: 2025-01-15
Payer: COMMERCIAL

## 2025-01-15 NOTE — PROGRESS NOTES
Spoke to pt on phone for virtual fin apt re benefits; [facility] dual complete eff 01/01/2025 with [facility] Medicaid.  Pt understands AR meds will be covered by Medicare part B at 80%; 20% picked by [facility].    Receives approx. $1050.00 a month SSDI. Discussed Donor bnfts. Discussed importance of Redeterms [place] . Patient had no insurance concerns; provide pt with [facility] contact info.

## 2025-01-20 ENCOUNTER — HOSPITAL ENCOUNTER (OUTPATIENT)
Dept: CARDIOLOGY | Facility: CLINIC | Age: 35
Discharge: HOME | End: 2025-01-20
Payer: MEDICARE

## 2025-01-20 DIAGNOSIS — K42.9 UMBILICAL HERNIA WITHOUT OBSTRUCTION AND WITHOUT GANGRENE: ICD-10-CM

## 2025-01-20 DIAGNOSIS — Z01.818 PRE-TRANSPLANT EVALUATION FOR CHRONIC KIDNEY DISEASE: ICD-10-CM

## 2025-01-20 PROCEDURE — 93306 TTE W/DOPPLER COMPLETE: CPT

## 2025-01-20 PROCEDURE — 93306 TTE W/DOPPLER COMPLETE: CPT | Performed by: INTERNAL MEDICINE

## 2025-01-21 LAB
EJECTION FRACTION APICAL 4 CHAMBER: 64.9
EJECTION FRACTION: 63 %
LEFT ATRIUM VOLUME AREA LENGTH INDEX BSA: 56.3 ML/M2
MITRAL VALVE E/A RATIO: 1.43
RIGHT VENTRICLE PEAK SYSTOLIC PRESSURE: 31.7 MMHG
TRICUSPID ANNULAR PLANE SYSTOLIC EXCURSION: 1.9 CM

## 2025-01-23 ENCOUNTER — APPOINTMENT (OUTPATIENT)
Dept: SURGERY | Facility: CLINIC | Age: 35
End: 2025-01-23
Payer: MEDICARE

## 2025-01-28 ENCOUNTER — TELEPHONE (OUTPATIENT)
Facility: HOSPITAL | Age: 35
End: 2025-01-28

## 2025-01-29 ENCOUNTER — APPOINTMENT (OUTPATIENT)
Dept: CARDIOLOGY | Facility: HOSPITAL | Age: 35
End: 2025-01-29
Payer: MEDICARE

## 2025-02-13 ENCOUNTER — SOCIAL WORK (OUTPATIENT)
Facility: HOSPITAL | Age: 35
End: 2025-02-13
Payer: MEDICARE

## 2025-02-13 ENCOUNTER — APPOINTMENT (OUTPATIENT)
Dept: SURGERY | Facility: CLINIC | Age: 35
End: 2025-02-13
Payer: MEDICARE

## 2025-02-13 NOTE — PROGRESS NOTES
"ENCOUNTER    Visit Type Initial Visit  Location: [place] [address]    What is your primary language? English  Other languages/fluency?    Barriers to Communication / Understanding:   [] Language [] Vision [] Hearing [] Other      []  Present     Accompanied By: Son, Harish (minor); Mother, Charis (Present for support discussion)    Organ For Transplant:  Kidney    Ethnicity:  Black Or     ADLs Fully Independent      Instrumental ADLs Fully Independent      Level of Activity Active      DME: Wheelchair occasionally    Knowledge of Health Good    Why Do You Have End Stage Organ Disease   Pt reported the cause of her kidney disease is APOL1.     When did you become aware of your diagnosis?   Pt reported she became aware 2.5 years ago.     Knowledge of Transplant / VAD:  Yes Patient Is Able To Make An Informed Decision    Patient Understands the Risks of Transplant / VAD   Yes Rejection  Yes Infection Yes Complications  Yes Low Risk of Death  Pt showed a limited understanding of the risks of transplant. SW reviewed the above risks with Pt.     Patient Understands Recovery and Follow-Up from Transplant / VAD  Yes Length Of State Yes Appointments  Yes Labs  Yes Rehabilitation  Pt showed a limited understanding of the transplant process. SW reviewed the above information with Pt.     Patient Has Identified Goals of Transplant / VAD Yes  Pt reported a goal of transplant is \"wanting my energy back.\"     What is your biggest concern?   Pt denied any concerns related to transplant at this time.     If previously denied listing, why were you denied? Please describe that experience and how it is different now.   [facility] (1 year ago) - Denied for blood pressure    Overall Compliance  Good       Amount of Daily Medications: 6-7   Compliance With Medications Good    Managed By Patient  Organized By: Bottles     Have you ever changed the way you take a medication without talking to the doctor?   No "     Understanding Of Medication  Good  Compliance With Appointments Good    What has your relationship been like with previous medical providers?      Fluid Restriction:   Yes [] Compliant   Unsure of limit but restricts herself to 1.5 bottles     Dialysis:  [x] What Dialysis Center   [facility] [x] Began   November 2022      [] In Center [x] Home Hemo [] Peritoneal       Attendance:  Treatment Attendance Good  Treatment Time 4 times a week    [] Shortened Treatments [] Rescheduled Treatments [] Missed Treatments          Diet:   Patient is compliant with renal restrictions     Patient is compliant with low sodium diet       # of Binders:  [] # of Binders per meal [] Meals per day      []  # of Binders per Snack [] Snacks per day     Pt reported she takes one powder packet a day.     Pancreas:  [] Checks blood sugar      times/day     Hypoglycemic Episodes  Outside Interventions      Liver:  Is Lactulose prescribed  Dose:   Timesper day:  Is patient compliant       SOCIAL HISTORY  No       Years of Service      Were you involved in active combat?                 Do you receive benefits through the [facility]?        Education:  education: HS Diploma    Literate Yes   Computer literate Yes  Internet access Yes       Sources of Income: SSDI ($1,078 monthly)  Patient's Current Employment    [] Full-Time [] Part-Time [] Unemployed [] Retired     []  None [] Not working by choice [x] Not working disabled     [] Short Term Leave   [] Other   Employment History   Owns an accessories business    Will patient have paid status from employment during recovery     Spouse / SO Current Employment     Will spouse / SO have paid status from employment during recovery     Other Sources of Income Total Household Income $13,000 yearly      Does patient have financial concerns   No     Is patient able to meet current monthly expenses   Yes    Resources:    Patient was provided information on transplant fundraising  "      Insurance:  Primary Insurance [employer] Dual Complete    Secondary Insurance     Prescription Coverage Copay cost per month $0    Comments:     FAMILY SYSTEM    Single Yes    How long   Describe Relationship    How long   Describe Relationship    When     When  In a Relationship   How Long  Describe Relationship    Spouse / SO Name   Age   Health   Other Caregiver Responsibilities  Spouse / Significant others reaction to donation    Children:  Yes # Biological (2 sons)   # Adopted    # Step Children    :     Child #1 Name Raymundo  Age 14  Health \"Fine\"    Lives With patient  How Much Contact Daily    Child #2 Name Harish  Age 12  Health \"Healthy\"    Lives With patient  How Much Contact Daily    Child #3 Name Age  Health    Lives   How Much Contact     Parents:  Raised By Both Biological Parents and Grandmother    Did the patient have contact with the other parent     Mother  No Age   Cause of Death   Father  No Age   Cause of Death    Living Parent #1 Charis, lives local, daily contact  Living Parent #2 Kenan, lives local, biweekly contact    Additional Information    Siblings:  [x] # Biological (1 brother, 1 sister) [x] # Half Siblings (6 total) [] # Step Siblings     Sibling #1  Sibling #2    Support & Recovery Plan:  No Adequate    Primary Support:  Name Beverly Phone [phone]  Age 25  Relationship to Patient Cousin  If employed, can they take time off work Yes   If so, is it paid time off    If not, will this impact your finances    Did they attend education classes No   Do they have other caregiver responsibilities (child or eldercare) No   Do they have their own conditions which may prevent them from providing care for you No  (Medical, psychological, physical limitations)    Are they available on short notice Yes   Are they reliable Yes   Are they responsible Yes   Are they able to understand and process new information Yes   Do they have " reliable transportation or will you allow them to use your vehicle Yes   Are they currently involved in your care Yes   Comments    Secondary Support:  Name Nancy Phone [phone] Age 33  Relationship to Patient Friend  If employed, can they take time off work Yes   If so, is it paid time off    If not, will this impact your finances    Did they attend education classes No   Do they have other caregiver responsibilities (child or eldercare) Yes   Pt reported there is someone to care for her friend's children.   Do they have their own conditions which may prevent them from providing care for you No  (Medical, psychological, physical limitations)    Are they available on short notice Yes   Are they reliable Yes   Are they responsible Yes   Are they able to understand and process new information Yes   Do they have reliable transportation or will you allow them to use your vehicle Yes   Are they currently involved in your care Yes   Comments    Alternate Support:  Name Charis Phone [phone] Age 55  Relationship to Patient Mother  If employed, can they take time off work Yes   If so, is it paid time off    If not, will this impact your finances    Did they attend education classes Yes   Do they have other caregiver responsibilities (child or eldercare) No   Do they have their own conditions which may prevent them from providing care for you No  (Medical, psychological, physical limitations)    Are they available on short notice Yes   Are they reliable Yes   Are they responsible Yes   Are they able to understand and process new information Yes   Do they have reliable transportation or will you allow them to use your vehicle No   Are they currently involved in your care Yes   Comments  Pt's mother reported she does not currently have a 's license.    Pt's mother expressed concern that the supports Pt listed will not be able to help Pt post-transplant. Pt denied any concerns though.    Alternate Support    How  "comfortable are you asking for and/or receiving help?     Housing:  Yes Adequate Rents home  Type of Home House  Indicate steps into home/bed/bathroom: 1 step into home/17 steps to bed/bath  Who all lives with Pt: 2 sons  Distance to [facility] 1 hour  Pets 0    Transportation:  Yes Adequate  # Licensed Drivers in the Home 1  Does Patient Drive Yes If not, why  # Reliable Vehicles 0  Does Patient use Public Transportation No  Does Patient use Medical Transportation No  Comments  SW informed Pt of the team's recommendation to not use any public/medical transportation for several months post-transplant.     MENTAL HEALTH    Cognition:  No impairment observed / reported    The patient reports their mood as \"sometimes I be irritated, stressed, overwhelmed.\"    Reported Mental Health Diagnosis   Depression  Family History of Mental Health Diagnosis   Depression and anxiety  What are patient psychosocial stressors   Pt reported her children and their grades, not currently having a vehicle, and food insecurity are current stressors. SW provided a list of food pantries in Pt's area.     Current Medications:  No  Mental Health Meds  Denied  Rx'd by   Sleep Meds  Denied  Rx'd by   Pain Meds  Denied  Rx'd by     OTC Meds   Tylenol  Past Mental Health Medications   1 (unsure of name)      Counseling Never  SW provided MH resources.     Has patient ever been hospitalized for mental health reasons No   Was the hospitalization voluntary  Duration   Where    When  Describe situation    Discharge Plan for Follow Up  Was Discharge Plan Completed   Referral to Transplant Psych   Yes       Suicide Assessment:  History of Suicide Ideation No  [] Timeframe     Frequency   Plan Created  Intent to Follow Through  Outcome      History of Suicide Attempt No       History of Suicidal Ideation in the past 3 months No   Intensity   Duration     Description of Plan      Plans thought of:   Intent to Follow Through:     Current Plan for Safety    Plan " for Follow-Up        In the past 6 months, has anyone physically, sexually, or emotionally abused you?   No     Ever in the past, has anyone physically, sexually, or emotionally abused you?   No    Patient's Reported Trauma History      Does Patient Feel Safe in Home   Yes        What are patient's coping behaviors   Pt reported spending time with family, going to concerts, and hanging out with friends as coping behaviors.     Do you have any activities that you're unable to do now, that you hope to return to after transplant?   Pt reported she is looking forward to going back to work.     Jehovah's witness / Spirituality   Denied    Do you have any Sabianism, ethical, or personal objections to accepting blood products, surgery, and/or transplant?   No      Thought Processes:   Attitude toward interviewer Cooperative and Appropriate    Eye Contact Patient maintained good eye contact throughout appointment    Appearance The patient was neatly groomed, appropriately dressed and adequately nourished    Affect Appropriate    Thought Process Appropriate      Substance Use /Abuse History:    Current Tobacco User No  Patient uses   Tobacco Frequency   For How Long      Former Tobacco User No  Describe past tobacco use and date quit      Current Alcohol User No  Type of Alcohol Used   Amount  Frequency   Pattern of Alcohol Use      If alcohol use disorder is suspected, ask the following:   Continued to use the substance despite being told the substance is affecting their health    History of problems at work, school or home due to substance use    History of DUI's/legal issues related to substance use       Former Alcohol User Yes  Describe past alcohol use and date quit  Pt reported her last alcohol use was 2 years ago. Pt shared she would drink at events a few times a year.     Has patient ever gone to CD treatment   If yes, When, Where and What type of Program  Attends AA meetings    Sponsor  Do support people drink alcohol   If  yes, describe support people's use  Is alcohol kept in the home   Does Patient need to sign a CD contract       Current Illegal / Unprescribed Drug User No  Type of Illegal Drug Used   Frequency  Pattern of Drug Use      Illegal / Unprescribed Drug #2  Type of Illegal Drug Used   Frequency  Pattern of Drug Use      Continue to use the substance despite being told the substance is affecting their health    History of problems at work, school or home due to substance use      Former Illegal / Unprescribed Drug User Yes  Describe past illegal drug use and date quit  Pt reported she tried a marijuana edible once.     Has patient ever gone to CD treatment   If yes, When, Where and What type of Program   Attends AA/NA  meetings    Is patient on a Methadone / Suboxone regiment   Do support people use illegal drugs   If yes, describe support people's use  Are illegal drugs kept in the home   Does Patient need to sign a CD contract       Prescription Drug Abuse:  No Has patient experienced feelings of addiction  No Has patient experienced symptoms of withdrawal  No Has patient experienced any side effects? e.g.  hallucinations or delusions    Does Patient Meet the Criteria for Alcohol Use Disorder No Diagnosis  Does Patient Meet [facility] guidelines Yes  Does Patient Meet the Criteria for Illegal Drug Use Disorder No Diagnosis  Does Patient Meet [facility] guidelines Yes    [facility] Substance Relapse Risk Factors   DSM-5 Severity Factors:       LEGAL ISSUES  No Arrests  No Currently probation or parole No   [facility] No  When   How long   Where       Citizenship:   Where were you born? [place], [place]   If outside of [place], where?   What year did you move to the [place]?     Yes [place] Citizen   Green Card  Visa    Any outstanding citizenship concerns?      Advance Directives: Yes  HCPOA   Living Will   Who is the proxy? Grandmother, Nancy    SHANITA provided documents.     Guardian:      HAIDER DIEHL met with Pt and Pt's son, Harish, for initial  "psychosocial assessment. Pt was pleasant and engaged. Pt reported she has [facility] Dual Complete for insurance. Pt showed a limited understanding of the risks of transplant. Pt denied any financial concerns at this time. Pt reported the cause of her kidney disease is APOL1. Pt reported a goal of transplant is \"wanting my energy back.\" Pt reported good compliance with medications, appointments, and dialysis treatments. Pt listed her cousin, Beverly, as primary support and her friend, Nancy, as secondary support. SW was unable to meet with either support during this visit. Pt also listed her mother, Charis, as an alternate support for care at home. Pt reported her mood as \"sometimes I be irritated, stressed, overwhelmed.\" Pt shared she has been diagnosed with depression and has utilized MH medications in the past. Pt scored an 11 on the PHQ-9, indicating moderate clinical depression. Pt scored a 3 on the JOAQUINA-7, indicating minimal clinical anxiety. SW referral to transplant psych. Pt denied any current tobacco, alcohol, or illicit drug use. Pt denied any past tobacco use. Pt reported her last alcohol use was 2 years ago. Pt shared she would drink at events a few times a year. Pt reported she tried a marijuana edible once. Pt scored a 32 on the SIPAT, indicating Pt is a minimally acceptable candidate for transplant. Pt would likely scored in the \"Good candidate\" range once Pt's support system is confirmed. SW would recommend listing Pt once Pt's support system has been confirmed and while monitoring Pt's identified risk factors. Pt's risk factors include Pt's limited understanding of the transplant process, Pt's inadequate support system, and Pt's MH.     PLAN  Pt to meet with transplant psych. SW to await recommendations from transplant psych. Pt would benefit from additional transplant education. SW to meet with Pt and Pt's primary support in 1 month to review/sign TCA and confirm commitment. SW to meet with " Pt in 6 months after this visit to monitor Pt's risk factors.

## 2025-02-18 ASSESSMENT — ANXIETY QUESTIONNAIRES
6. BECOMING EASILY ANNOYED OR IRRITABLE: NEARLY EVERY DAY
5. BEING SO RESTLESS THAT IT IS HARD TO SIT STILL: NOT AT ALL
1. FEELING NERVOUS, ANXIOUS, OR ON EDGE: NOT AT ALL
GAD7 TOTAL SCORE: 3
IF YOU CHECKED OFF ANY PROBLEMS ON THIS QUESTIONNAIRE, HOW DIFFICULT HAVE THESE PROBLEMS MADE IT FOR YOU TO DO YOUR WORK, TAKE CARE OF THINGS AT HOME, OR GET ALONG WITH OTHER PEOPLE: NOT DIFFICULT AT ALL
7. FEELING AFRAID AS IF SOMETHING AWFUL MIGHT HAPPEN: NOT AT ALL
2. NOT BEING ABLE TO STOP OR CONTROL WORRYING: NOT AT ALL
4. TROUBLE RELAXING: NOT AT ALL
3. WORRYING TOO MUCH ABOUT DIFFERENT THINGS: NOT AT ALL

## 2025-02-18 ASSESSMENT — PATIENT HEALTH QUESTIONNAIRE - PHQ9
9. THOUGHTS THAT YOU WOULD BE BETTER OFF DEAD, OR OF HURTING YOURSELF: NOT AT ALL
3. TROUBLE FALLING OR STAYING ASLEEP OR SLEEPING TOO MUCH: NEARLY EVERY DAY
SUM OF ALL RESPONSES TO PHQ9 QUESTIONS 1 & 2: 3
6. FEELING BAD ABOUT YOURSELF - OR THAT YOU ARE A FAILURE OR HAVE LET YOURSELF OR YOUR FAMILY DOWN: NOT AT ALL
8. MOVING OR SPEAKING SO SLOWLY THAT OTHER PEOPLE COULD HAVE NOTICED. OR THE OPPOSITE, BEING SO FIGETY OR RESTLESS THAT YOU HAVE BEEN MOVING AROUND A LOT MORE THAN USUAL: NOT AT ALL
5. POOR APPETITE OR OVEREATING: MORE THAN HALF THE DAYS
4. FEELING TIRED OR HAVING LITTLE ENERGY: NEARLY EVERY DAY
7. TROUBLE CONCENTRATING ON THINGS, SUCH AS READING THE NEWSPAPER OR WATCHING TELEVISION: NOT AT ALL
SUM OF ALL RESPONSES TO PHQ QUESTIONS 1-9: 11
1. LITTLE INTEREST OR PLEASURE IN DOING THINGS: MORE THAN HALF THE DAYS
2. FEELING DOWN, DEPRESSED OR HOPELESS: SEVERAL DAYS

## 2025-02-27 ENCOUNTER — HOSPITAL ENCOUNTER (OUTPATIENT)
Facility: HOSPITAL | Age: 35
Setting detail: OUTPATIENT SURGERY
End: 2025-02-27
Attending: STUDENT IN AN ORGANIZED HEALTH CARE EDUCATION/TRAINING PROGRAM | Admitting: STUDENT IN AN ORGANIZED HEALTH CARE EDUCATION/TRAINING PROGRAM
Payer: MEDICARE

## 2025-02-27 DIAGNOSIS — K43.9 VENTRAL HERNIA WITHOUT OBSTRUCTION OR GANGRENE: Primary | ICD-10-CM

## 2025-03-03 ENCOUNTER — DOCUMENTATION (OUTPATIENT)
Dept: TRANSPLANT | Facility: HOSPITAL | Age: 35
End: 2025-03-03
Payer: MEDICARE

## 2025-03-03 NOTE — PROGRESS NOTES
Emailed  precert staff regarding billing for schd outpt procedure. [employer] case is open. Hernia repair would be billed out.

## 2025-05-28 ENCOUNTER — APPOINTMENT (OUTPATIENT)
Dept: SURGERY | Facility: CLINIC | Age: 35
End: 2025-05-28
Payer: MEDICARE

## 2025-08-27 ENCOUNTER — DOCUMENTATION (OUTPATIENT)
Facility: HOSPITAL | Age: 35
End: 2025-08-27
Payer: MEDICARE